# Patient Record
Sex: FEMALE | Race: WHITE | Employment: PART TIME | URBAN - METROPOLITAN AREA
[De-identification: names, ages, dates, MRNs, and addresses within clinical notes are randomized per-mention and may not be internally consistent; named-entity substitution may affect disease eponyms.]

---

## 2018-05-11 PROCEDURE — 87624 HPV HI-RISK TYP POOLED RSLT: CPT | Performed by: FAMILY MEDICINE

## 2018-05-11 PROCEDURE — 88175 CYTOPATH C/V AUTO FLUID REDO: CPT | Performed by: FAMILY MEDICINE

## 2022-01-21 ENCOUNTER — OFFICE VISIT (OUTPATIENT)
Dept: FAMILY MEDICINE CLINIC | Facility: CLINIC | Age: 57
End: 2022-01-21
Payer: COMMERCIAL

## 2022-01-21 VITALS
BODY MASS INDEX: 23.27 KG/M2 | HEIGHT: 67.28 IN | DIASTOLIC BLOOD PRESSURE: 60 MMHG | WEIGHT: 150 LBS | HEART RATE: 84 BPM | SYSTOLIC BLOOD PRESSURE: 100 MMHG | TEMPERATURE: 98 F

## 2022-01-21 DIAGNOSIS — Z00.00 LABORATORY EXAMINATION ORDERED AS PART OF A ROUTINE GENERAL MEDICAL EXAMINATION: ICD-10-CM

## 2022-01-21 DIAGNOSIS — Z01.419 WELL FEMALE EXAM WITH ROUTINE GYNECOLOGICAL EXAM: Primary | ICD-10-CM

## 2022-01-21 DIAGNOSIS — Z12.4 SCREENING FOR MALIGNANT NEOPLASM OF CERVIX: ICD-10-CM

## 2022-01-21 DIAGNOSIS — Z13.820 OSTEOPOROSIS SCREENING: ICD-10-CM

## 2022-01-21 DIAGNOSIS — Z12.31 VISIT FOR SCREENING MAMMOGRAM: ICD-10-CM

## 2022-01-21 PROCEDURE — 3074F SYST BP LT 130 MM HG: CPT | Performed by: FAMILY MEDICINE

## 2022-01-21 PROCEDURE — 99386 PREV VISIT NEW AGE 40-64: CPT | Performed by: FAMILY MEDICINE

## 2022-01-21 PROCEDURE — 88175 CYTOPATH C/V AUTO FLUID REDO: CPT | Performed by: FAMILY MEDICINE

## 2022-01-21 PROCEDURE — 87624 HPV HI-RISK TYP POOLED RSLT: CPT | Performed by: FAMILY MEDICINE

## 2022-01-21 PROCEDURE — 3078F DIAST BP <80 MM HG: CPT | Performed by: FAMILY MEDICINE

## 2022-01-21 PROCEDURE — 3008F BODY MASS INDEX DOCD: CPT | Performed by: FAMILY MEDICINE

## 2022-01-21 RX ORDER — LORATADINE 10 MG/1
10 TABLET ORAL DAILY PRN
COMMUNITY

## 2022-01-21 NOTE — PATIENT INSTRUCTIONS
Routine Healthcare for Women   Routine checkups can find treatable problems early. For many medical problems, early treatment can help prevent more serious complications. The value of checkups and how often you have them depend mainly on your age.  Your per family history of high cholesterol. • Colorectal cancer test: if you are 48 or older.  Recommended tests include a yearly test for blood in the stool, called the fecal occult blood test (FOBT) or fecal immunochemical test (FIT), and one of the following t personal and family medical history. Many other tests are often done at routine checkups, but there is no current evidence that they are helpful as routine screening tests for healthy women.  Examples of such tests are a CBC (complete blood count), thyroi a younger age if you have a high-risk medical condition, such as diabetes. • Varicella (chickenpox) if you have never had chickenpox. • Zoster (shingles) vaccine: if you are 50 or older. The vaccine can help prevent shingles.  It can also reduce the nish

## 2022-01-21 NOTE — PROGRESS NOTES
HPI:   Kendall Retana is a 64year old female who presents for a complete physical exam.   Patient has no significant past medical history besides a spontaneous pneumothorax in 1990.   Past surgical history negative  Menstrual history menopause at 48 last pa mg/dL    Blood Urea Nitrogen 23 (H) 8 - 20 mg/dL    Creatinine 0.77 0.55 - 1.02 mg/dL    Sodium 140 136 - 144 mmol/L    Potassium 4.7 3.6 - 5.1 mmol/L    Chloride 103 101 - 111 mmol/L    Carbon Dioxide 29.8 22.0 - 32.0 mmol/L    Calcium 9.6 8.3 - 10.3 mg/d Case: [id number]                                  Authorizing Provider:  Ruby March DO           Collected:           05/11/2018 09:27 AM          [place]ing [place]     [facility] [place]  Received:            05/14/2018 09:42 AM Disorder Father         vlve replacement   • Hypertension Father    • Lipids Father    • Other (Other) Father    • Lipids Mother    • Other (Other) Mother    • Other (Other) Daughter    • Other (Other) Son    • Heart Disorder Maternal Grandmother    • Hear 05/07/2013   BMI 23.30 kg/m²   Body mass index is 23.3 kg/m².    GENERAL: well developed, well nourished and in no apparent distress  SKIN: no rashes,no suspicious lesions  HEENT: atraumatic, normocephalic,ears, nose and throat are normal  EYES: PERRLA, EOM Health maintenance guidance given including vision and dental exams, vitamin D 1,000 iu daily with calcium 500 mg daily. Lifestyle guidance provided recommended low fat diet and aerobic exercise 30 minutes 3-4 times weekly.     Ultrafast CT of the heart an

## 2022-01-22 PROBLEM — Z01.419 WELL FEMALE EXAM WITH ROUTINE GYNECOLOGICAL EXAM: Status: ACTIVE | Noted: 2022-01-22

## 2022-01-24 LAB — HPV I/H RISK 1 DNA SPEC QL NAA+PROBE: NEGATIVE

## 2022-02-01 LAB — LAST PAP RESULT: NORMAL

## 2022-02-09 ENCOUNTER — HOSPITAL ENCOUNTER (OUTPATIENT)
Dept: BONE DENSITY | Age: 57
Discharge: HOME | End: 2022-02-09
Attending: FAMILY MEDICINE
Payer: COMMERCIAL

## 2022-02-09 ENCOUNTER — HOSPITAL ENCOUNTER (OUTPATIENT)
Dept: MAMMOGRAPHY | Age: 57
Discharge: HOME | End: 2022-02-09
Attending: FAMILY MEDICINE
Payer: COMMERCIAL

## 2022-02-09 ENCOUNTER — LAB ENCOUNTER (OUTPATIENT)
Dept: LAB | Age: 57
End: 2022-02-09
Attending: FAMILY MEDICINE
Payer: COMMERCIAL

## 2022-02-09 DIAGNOSIS — Z12.31 VISIT FOR SCREENING MAMMOGRAM: ICD-10-CM

## 2022-02-09 DIAGNOSIS — Z00.00 LABORATORY EXAMINATION ORDERED AS PART OF A ROUTINE GENERAL MEDICAL EXAMINATION: ICD-10-CM

## 2022-02-09 DIAGNOSIS — Z13.820 OSTEOPOROSIS SCREENING: ICD-10-CM

## 2022-02-09 LAB
ALBUMIN SERPL-MCNC: 4.1 G/DL (ref 3.4–5)
ALBUMIN/GLOB SERPL: 1.2 {RATIO} (ref 1–2)
ALP LIVER SERPL-CCNC: 60 U/L
ALT SERPL-CCNC: 18 U/L
ANION GAP SERPL CALC-SCNC: 7 MMOL/L (ref 0–18)
AST SERPL-CCNC: 15 U/L (ref 15–37)
BASOPHILS # BLD AUTO: 0.04 X10(3) UL (ref 0–0.2)
BASOPHILS NFR BLD AUTO: 0.8 %
BILIRUB SERPL-MCNC: 0.7 MG/DL (ref 0.1–2)
BUN BLD-MCNC: 16 MG/DL (ref 7–18)
CALCIUM BLD-MCNC: 9.9 MG/DL (ref 8.5–10.1)
CHLORIDE SERPL-SCNC: 105 MMOL/L (ref 98–112)
CHOLEST SERPL-MCNC: 292 MG/DL (ref ?–200)
CO2 SERPL-SCNC: 27 MMOL/L (ref 21–32)
CREAT BLD-MCNC: 0.78 MG/DL
EOSINOPHIL # BLD AUTO: 0.1 X10(3) UL (ref 0–0.7)
EOSINOPHIL NFR BLD AUTO: 2 %
ERYTHROCYTE [DISTWIDTH] IN BLOOD BY AUTOMATED COUNT: 12.6 %
FASTING PATIENT LIPID ANSWER: YES
FASTING STATUS PATIENT QL REPORTED: YES
GLOBULIN PLAS-MCNC: 3.3 G/DL (ref 2.8–4.4)
GLUCOSE BLD-MCNC: 95 MG/DL (ref 70–99)
HCT VFR BLD AUTO: 40.5 %
HDLC SERPL-MCNC: 104 MG/DL (ref 40–59)
HGB BLD-MCNC: 13.6 G/DL
IMM GRANULOCYTES # BLD AUTO: 0.01 X10(3) UL (ref 0–1)
IMM GRANULOCYTES NFR BLD: 0.2 %
LDLC SERPL CALC-MCNC: 173 MG/DL (ref ?–100)
LYMPHOCYTES # BLD AUTO: 2.04 X10(3) UL (ref 1–4)
LYMPHOCYTES NFR BLD AUTO: 39.9 %
MCH RBC QN AUTO: 30.9 PG (ref 26–34)
MCHC RBC AUTO-ENTMCNC: 33.6 G/DL (ref 31–37)
MCV RBC AUTO: 92 FL
MONOCYTES # BLD AUTO: 0.43 X10(3) UL (ref 0.1–1)
MONOCYTES NFR BLD AUTO: 8.4 %
NEUTROPHILS # BLD AUTO: 2.49 X10 (3) UL (ref 1.5–7.7)
NEUTROPHILS # BLD AUTO: 2.49 X10(3) UL (ref 1.5–7.7)
NEUTROPHILS NFR BLD AUTO: 48.7 %
NONHDLC SERPL-MCNC: 188 MG/DL (ref ?–130)
OSMOLALITY SERPL CALC.SUM OF ELEC: 289 MOSM/KG (ref 275–295)
PLATELET # BLD AUTO: 244 10(3)UL (ref 150–450)
POTASSIUM SERPL-SCNC: 3.8 MMOL/L (ref 3.5–5.1)
PROT SERPL-MCNC: 7.4 G/DL (ref 6.4–8.2)
RBC # BLD AUTO: 4.4 X10(6)UL
SODIUM SERPL-SCNC: 139 MMOL/L (ref 136–145)
T4 FREE SERPL-MCNC: 0.9 NG/DL (ref 0.8–1.7)
TRIGL SERPL-MCNC: 93 MG/DL (ref 30–149)
TSI SER-ACNC: 2.55 MIU/ML (ref 0.36–3.74)
VLDLC SERPL CALC-MCNC: 18 MG/DL (ref 0–30)
WBC # BLD AUTO: 5.1 X10(3) UL (ref 4–11)

## 2022-02-09 PROCEDURE — 84439 ASSAY OF FREE THYROXINE: CPT | Performed by: FAMILY MEDICINE

## 2022-02-09 PROCEDURE — 80050 GENERAL HEALTH PANEL: CPT | Performed by: FAMILY MEDICINE

## 2022-02-09 PROCEDURE — 77063 BREAST TOMOSYNTHESIS BI: CPT | Performed by: FAMILY MEDICINE

## 2022-02-09 PROCEDURE — 77080 DXA BONE DENSITY AXIAL: CPT | Performed by: FAMILY MEDICINE

## 2022-02-09 PROCEDURE — 80061 LIPID PANEL: CPT | Performed by: FAMILY MEDICINE

## 2022-02-09 PROCEDURE — 77067 SCR MAMMO BI INCL CAD: CPT | Performed by: FAMILY MEDICINE

## 2022-02-10 NOTE — PROGRESS NOTES
Mild osteopenia lumbar spine and femoral neck there is no osteoporosis. Repeat in 2-3 years. No smoking, regular exercise, take calcium 500 mg and vitamin D 1,000 iu daily. Weight bearing exercise. Follow-up to discuss test results.

## 2022-02-28 ENCOUNTER — OFFICE VISIT (OUTPATIENT)
Dept: FAMILY MEDICINE CLINIC | Facility: CLINIC | Age: 57
End: 2022-02-28
Payer: COMMERCIAL

## 2022-02-28 VITALS
BODY MASS INDEX: 23.58 KG/M2 | TEMPERATURE: 99 F | SYSTOLIC BLOOD PRESSURE: 100 MMHG | HEIGHT: 67.28 IN | DIASTOLIC BLOOD PRESSURE: 64 MMHG | HEART RATE: 84 BPM | WEIGHT: 152 LBS

## 2022-02-28 DIAGNOSIS — E78.2 MIXED HYPERLIPIDEMIA: Primary | ICD-10-CM

## 2022-02-28 PROCEDURE — 3008F BODY MASS INDEX DOCD: CPT | Performed by: FAMILY MEDICINE

## 2022-02-28 PROCEDURE — 3074F SYST BP LT 130 MM HG: CPT | Performed by: FAMILY MEDICINE

## 2022-02-28 PROCEDURE — 99213 OFFICE O/P EST LOW 20 MIN: CPT | Performed by: FAMILY MEDICINE

## 2022-02-28 PROCEDURE — 3078F DIAST BP <80 MM HG: CPT | Performed by: FAMILY MEDICINE

## 2022-03-01 PROBLEM — E78.2 MIXED HYPERLIPIDEMIA: Status: ACTIVE | Noted: 2022-03-01

## 2022-03-01 PROBLEM — Z01.419 WELL FEMALE EXAM WITH ROUTINE GYNECOLOGICAL EXAM: Status: RESOLVED | Noted: 2022-01-22 | Resolved: 2022-03-01

## 2022-04-12 ENCOUNTER — HOSPITAL ENCOUNTER (OUTPATIENT)
Dept: CT IMAGING | Age: 57
Discharge: HOME | End: 2022-04-12
Attending: FAMILY MEDICINE

## 2022-04-12 DIAGNOSIS — Z13.6 SCREENING FOR CARDIOVASCULAR CONDITION: ICD-10-CM

## 2022-04-18 ENCOUNTER — HOSPITAL ENCOUNTER (OUTPATIENT)
Dept: ULTRASOUND IMAGING | Facility: HOSPITAL | Age: 57
Discharge: HOME | End: 2022-04-18
Attending: FAMILY MEDICINE

## 2022-04-18 DIAGNOSIS — Z13.9 ENCOUNTER FOR SCREENING: ICD-10-CM

## 2022-07-05 ENCOUNTER — PATIENT MESSAGE (OUTPATIENT)
Dept: FAMILY MEDICINE CLINIC | Facility: CLINIC | Age: 57
End: 2022-07-05

## 2022-07-06 NOTE — TELEPHONE ENCOUNTER
From: Donya Salguero  To: Stepan Evans PA-C  Sent: 7/5/2022 1:36 PM CDT  Subject: Follow-Up Bloodwork    Hi,  I hope all is well. I am following up on next steps regarding my high cholesterol which I believe is a lipid profile. Do I need a referral? And once I have completed the profile I will schedule an appointment. Please share any thoughts or questions. Thank you and enjoy the day!   [place]

## 2022-08-05 ENCOUNTER — LABORATORY ENCOUNTER (OUTPATIENT)
Dept: LAB | Age: 57
End: 2022-08-05
Attending: FAMILY MEDICINE
Payer: COMMERCIAL

## 2022-08-05 DIAGNOSIS — E78.2 MIXED HYPERLIPIDEMIA: ICD-10-CM

## 2022-08-05 LAB
CHOLEST SERPL-MCNC: 246 MG/DL (ref ?–200)
FASTING PATIENT LIPID ANSWER: YES
HDLC SERPL-MCNC: 88 MG/DL (ref 40–59)
LDLC SERPL CALC-MCNC: 143 MG/DL (ref ?–100)
NONHDLC SERPL-MCNC: 158 MG/DL (ref ?–130)
TRIGL SERPL-MCNC: 89 MG/DL (ref 30–149)
VLDLC SERPL CALC-MCNC: 16 MG/DL (ref 0–30)

## 2022-08-05 PROCEDURE — 80061 LIPID PANEL: CPT | Performed by: FAMILY MEDICINE

## 2022-08-06 NOTE — PROGRESS NOTES
Lipids cholesterol and LDL are improving you have had a normal heart scan and peripheral vascular screening. Continue with the healthy lifestyle adding Reynaldo seeds and low saturated fat meals. Repeat again in 6 months if goes up again would consider statin for cardioprotection. Place order for lipid in 6 months.

## 2022-08-09 DIAGNOSIS — E78.2 MIXED HYPERLIPIDEMIA: Primary | ICD-10-CM

## 2023-09-15 ENCOUNTER — OFFICE VISIT (OUTPATIENT)
Dept: FAMILY MEDICINE CLINIC | Facility: CLINIC | Age: 58
End: 2023-09-15
Payer: COMMERCIAL

## 2023-09-15 VITALS
BODY MASS INDEX: 24.17 KG/M2 | TEMPERATURE: 98 F | WEIGHT: 154 LBS | DIASTOLIC BLOOD PRESSURE: 64 MMHG | HEIGHT: 67 IN | SYSTOLIC BLOOD PRESSURE: 110 MMHG | OXYGEN SATURATION: 98 % | HEART RATE: 70 BPM | RESPIRATION RATE: 18 BRPM

## 2023-09-15 DIAGNOSIS — Z13.0 SCREENING FOR ENDOCRINE, NUTRITIONAL, METABOLIC AND IMMUNITY DISORDER: ICD-10-CM

## 2023-09-15 DIAGNOSIS — Z13.220 ENCOUNTER FOR LIPID SCREENING FOR CARDIOVASCULAR DISEASE: ICD-10-CM

## 2023-09-15 DIAGNOSIS — Z13.6 ENCOUNTER FOR LIPID SCREENING FOR CARDIOVASCULAR DISEASE: ICD-10-CM

## 2023-09-15 DIAGNOSIS — Z13.29 SCREENING FOR ENDOCRINE, NUTRITIONAL, METABOLIC AND IMMUNITY DISORDER: ICD-10-CM

## 2023-09-15 DIAGNOSIS — Z13.21 SCREENING FOR ENDOCRINE, NUTRITIONAL, METABOLIC AND IMMUNITY DISORDER: ICD-10-CM

## 2023-09-15 DIAGNOSIS — Z28.21 REFUSED INFLUENZA VACCINE: ICD-10-CM

## 2023-09-15 DIAGNOSIS — Z13.228 SCREENING FOR ENDOCRINE, NUTRITIONAL, METABOLIC AND IMMUNITY DISORDER: ICD-10-CM

## 2023-09-15 DIAGNOSIS — Z12.31 SCREENING MAMMOGRAM FOR BREAST CANCER: ICD-10-CM

## 2023-09-15 DIAGNOSIS — Z01.419 WELL FEMALE EXAM WITH ROUTINE GYNECOLOGICAL EXAM: Primary | ICD-10-CM

## 2023-09-15 DIAGNOSIS — Z12.4 ENCOUNTER FOR SCREENING FOR CERVICAL CANCER: ICD-10-CM

## 2023-09-15 DIAGNOSIS — D22.9 MULTIPLE PIGMENTED NEVI: ICD-10-CM

## 2023-09-15 DIAGNOSIS — Z11.51 ENCOUNTER FOR SCREENING FOR HUMAN PAPILLOMAVIRUS (HPV): ICD-10-CM

## 2023-09-15 PROCEDURE — 88175 CYTOPATH C/V AUTO FLUID REDO: CPT | Performed by: FAMILY MEDICINE

## 2023-09-15 PROCEDURE — 87624 HPV HI-RISK TYP POOLED RSLT: CPT | Performed by: FAMILY MEDICINE

## 2023-09-18 LAB — HPV I/H RISK 1 DNA SPEC QL NAA+PROBE: NEGATIVE

## 2023-10-11 ENCOUNTER — LABORATORY ENCOUNTER (OUTPATIENT)
Dept: LAB | Age: 58
End: 2023-10-11
Attending: FAMILY MEDICINE
Payer: COMMERCIAL

## 2023-10-11 DIAGNOSIS — Z13.0 SCREENING FOR ENDOCRINE, NUTRITIONAL, METABOLIC AND IMMUNITY DISORDER: ICD-10-CM

## 2023-10-11 DIAGNOSIS — Z13.21 SCREENING FOR ENDOCRINE, NUTRITIONAL, METABOLIC AND IMMUNITY DISORDER: ICD-10-CM

## 2023-10-11 DIAGNOSIS — Z13.29 SCREENING FOR ENDOCRINE, NUTRITIONAL, METABOLIC AND IMMUNITY DISORDER: ICD-10-CM

## 2023-10-11 DIAGNOSIS — Z13.220 ENCOUNTER FOR LIPID SCREENING FOR CARDIOVASCULAR DISEASE: ICD-10-CM

## 2023-10-11 DIAGNOSIS — Z13.6 ENCOUNTER FOR LIPID SCREENING FOR CARDIOVASCULAR DISEASE: ICD-10-CM

## 2023-10-11 DIAGNOSIS — Z13.228 SCREENING FOR ENDOCRINE, NUTRITIONAL, METABOLIC AND IMMUNITY DISORDER: ICD-10-CM

## 2023-10-11 LAB
ALBUMIN SERPL-MCNC: 4 G/DL (ref 3.4–5)
ALBUMIN/GLOB SERPL: 1.1 {RATIO} (ref 1–2)
ALP LIVER SERPL-CCNC: 62 U/L
ALT SERPL-CCNC: 21 U/L
ANION GAP SERPL CALC-SCNC: 5 MMOL/L (ref 0–18)
AST SERPL-CCNC: 8 U/L (ref 15–37)
BASOPHILS # BLD AUTO: 0.03 X10(3) UL (ref 0–0.2)
BASOPHILS NFR BLD AUTO: 0.7 %
BILIRUB SERPL-MCNC: 0.7 MG/DL (ref 0.1–2)
BUN BLD-MCNC: 16 MG/DL (ref 7–18)
CALCIUM BLD-MCNC: 9.2 MG/DL (ref 8.5–10.1)
CHLORIDE SERPL-SCNC: 107 MMOL/L (ref 98–112)
CHOLEST SERPL-MCNC: 238 MG/DL (ref ?–200)
CO2 SERPL-SCNC: 27 MMOL/L (ref 21–32)
CREAT BLD-MCNC: 0.77 MG/DL
EGFRCR SERPLBLD CKD-EPI 2021: 90 ML/MIN/1.73M2 (ref 60–?)
EOSINOPHIL # BLD AUTO: 0.18 X10(3) UL (ref 0–0.7)
EOSINOPHIL NFR BLD AUTO: 4.4 %
ERYTHROCYTE [DISTWIDTH] IN BLOOD BY AUTOMATED COUNT: 12.5 %
FASTING PATIENT LIPID ANSWER: YES
FASTING STATUS PATIENT QL REPORTED: YES
GLOBULIN PLAS-MCNC: 3.6 G/DL (ref 2.8–4.4)
GLUCOSE BLD-MCNC: 90 MG/DL (ref 70–99)
HCT VFR BLD AUTO: 39.5 %
HDLC SERPL-MCNC: 94 MG/DL (ref 40–59)
HGB BLD-MCNC: 13.1 G/DL
IMM GRANULOCYTES # BLD AUTO: 0.01 X10(3) UL (ref 0–1)
IMM GRANULOCYTES NFR BLD: 0.2 %
LDLC SERPL CALC-MCNC: 129 MG/DL (ref ?–100)
LYMPHOCYTES # BLD AUTO: 1.55 X10(3) UL (ref 1–4)
LYMPHOCYTES NFR BLD AUTO: 37.8 %
MCH RBC QN AUTO: 31 PG (ref 26–34)
MCHC RBC AUTO-ENTMCNC: 33.2 G/DL (ref 31–37)
MCV RBC AUTO: 93.6 FL
MONOCYTES # BLD AUTO: 0.36 X10(3) UL (ref 0.1–1)
MONOCYTES NFR BLD AUTO: 8.8 %
NEUTROPHILS # BLD AUTO: 1.97 X10 (3) UL (ref 1.5–7.7)
NEUTROPHILS # BLD AUTO: 1.97 X10(3) UL (ref 1.5–7.7)
NEUTROPHILS NFR BLD AUTO: 48.1 %
NONHDLC SERPL-MCNC: 144 MG/DL (ref ?–130)
OSMOLALITY SERPL CALC.SUM OF ELEC: 289 MOSM/KG (ref 275–295)
PLATELET # BLD AUTO: 232 10(3)UL (ref 150–450)
POTASSIUM SERPL-SCNC: 4.2 MMOL/L (ref 3.5–5.1)
PROT SERPL-MCNC: 7.6 G/DL (ref 6.4–8.2)
RBC # BLD AUTO: 4.22 X10(6)UL
SODIUM SERPL-SCNC: 139 MMOL/L (ref 136–145)
TRIGL SERPL-MCNC: 87 MG/DL (ref 30–149)
TSI SER-ACNC: 3.6 MIU/ML (ref 0.36–3.74)
VLDLC SERPL CALC-MCNC: 16 MG/DL (ref 0–30)
WBC # BLD AUTO: 4.1 X10(3) UL (ref 4–11)

## 2023-10-11 PROCEDURE — 80050 GENERAL HEALTH PANEL: CPT | Performed by: FAMILY MEDICINE

## 2023-10-11 PROCEDURE — 80061 LIPID PANEL: CPT | Performed by: FAMILY MEDICINE

## 2023-10-12 NOTE — PROGRESS NOTES
Continue with reducing saturated fats LDL is improved try gomez seeds in daily to your diet and repeat in 1 year. Decrease saturated fats. Eat lean cuts of meat such as chicken and fish (salmon and tuna). If able, add gomez seeds, almonds, walnuts, pumpkin seeds, sunflower seeds, beans, whole grain cereals to diet. Cook with grape seed oil or olive oil. Review the Mediterranean diet on line. Normal white and red blood cell counts. Normal kidney and liver function testing. Normal blood sugar testing.   Normal lipid testing   Normal thyroid testing  Sincerely,   Carlos A Cortes PA-C

## 2023-10-19 ENCOUNTER — HOSPITAL ENCOUNTER (OUTPATIENT)
Dept: MAMMOGRAPHY | Age: 58
Discharge: HOME | End: 2023-10-19
Attending: FAMILY MEDICINE
Payer: COMMERCIAL

## 2023-10-19 DIAGNOSIS — R92.333 HETEROGENEOUSLY DENSE TISSUE OF BOTH BREASTS ON MAMMOGRAPHY: Primary | ICD-10-CM

## 2023-10-19 DIAGNOSIS — Z12.31 SCREENING MAMMOGRAM FOR BREAST CANCER: ICD-10-CM

## 2023-10-19 PROCEDURE — 77067 SCR MAMMO BI INCL CAD: CPT | Performed by: FAMILY MEDICINE

## 2023-10-19 PROCEDURE — 77063 BREAST TOMOSYNTHESIS BI: CPT | Performed by: FAMILY MEDICINE

## 2023-10-19 NOTE — PROGRESS NOTES
Normal mammogram with dense breast tissue repeat mammogram in 1 year. Mammogram is normal repeat in one year. You would also benefit from doing a bilateral whole breast screening ultrasound for increased sensitivity for detection of breast cancer which would be obscured secondary to dense breasts. Order placed if you would like to have this done. Scheduling Instructions: To schedule an appointment for your radiology test please call [facility] at [phone].       Sincerely,   Valdemar Cogan PA-C

## 2024-10-02 ENCOUNTER — PATIENT MESSAGE (OUTPATIENT)
Dept: FAMILY MEDICINE CLINIC | Facility: CLINIC | Age: 59
End: 2024-10-02

## 2024-10-02 DIAGNOSIS — Z13.29 SCREENING FOR ENDOCRINE, NUTRITIONAL, METABOLIC AND IMMUNITY DISORDER: ICD-10-CM

## 2024-10-02 DIAGNOSIS — Z12.31 VISIT FOR SCREENING MAMMOGRAM: Primary | ICD-10-CM

## 2024-10-02 DIAGNOSIS — Z13.0 SCREENING FOR ENDOCRINE, NUTRITIONAL, METABOLIC AND IMMUNITY DISORDER: ICD-10-CM

## 2024-10-02 DIAGNOSIS — Z13.6 ENCOUNTER FOR LIPID SCREENING FOR CARDIOVASCULAR DISEASE: ICD-10-CM

## 2024-10-02 DIAGNOSIS — Z13.220 ENCOUNTER FOR LIPID SCREENING FOR CARDIOVASCULAR DISEASE: ICD-10-CM

## 2024-10-02 DIAGNOSIS — Z13.228 SCREENING FOR ENDOCRINE, NUTRITIONAL, METABOLIC AND IMMUNITY DISORDER: ICD-10-CM

## 2024-10-02 DIAGNOSIS — Z13.21 SCREENING FOR ENDOCRINE, NUTRITIONAL, METABOLIC AND IMMUNITY DISORDER: ICD-10-CM

## 2024-10-02 NOTE — TELEPHONE ENCOUNTER
From: Lisbeth Francisco  To: Stacie Syed  Sent: 10/2/2024 7:44 AM CDT  Subject: Referrals    Happy Wednesday!! I thought I scheduled an appointment today but did not for some reason. LOL!! At least I checked and did not just show up!! I would like to schedule bloodwork, mammograms and a more detalied mammogram prior to my scheduled appointment. Can you send me referrals prior to my December appointment so I can schedule before the end of the year? Thanks

## 2024-11-08 ENCOUNTER — HOSPITAL ENCOUNTER (OUTPATIENT)
Dept: MAMMOGRAPHY | Age: 59
Discharge: HOME | End: 2024-11-08
Attending: FAMILY MEDICINE
Payer: COMMERCIAL

## 2024-11-08 DIAGNOSIS — R92.333 HETEROGENEOUSLY DENSE TISSUE OF BOTH BREASTS ON MAMMOGRAPHY: Primary | ICD-10-CM

## 2024-11-08 DIAGNOSIS — Z12.31 VISIT FOR SCREENING MAMMOGRAM: ICD-10-CM

## 2024-11-08 PROCEDURE — 77067 SCR MAMMO BI INCL CAD: CPT | Performed by: FAMILY MEDICINE

## 2024-11-08 PROCEDURE — 77063 BREAST TOMOSYNTHESIS BI: CPT | Performed by: FAMILY MEDICINE

## 2024-11-08 NOTE — PROGRESS NOTES
Left breast asymmetry.  Additional views recommended.  Schedule at your earliest convenience    Due to dense breasts bilaterally you would also benefit from doing a bilateral whole breast screening ultrasound for increased sensitivity for detection of breast cancer which could be obscured secondary to dense breasts.  Order placed if you would like to have this done.    Scheduling Instructions:  To schedule an appointment for your radiology test please call [facility] Scheduling at [phone].      Sincerely,   Stacie Syed PA-C

## 2024-11-22 ENCOUNTER — HOSPITAL ENCOUNTER (OUTPATIENT)
Dept: MAMMOGRAPHY | Facility: HOSPITAL | Age: 59
Discharge: HOME | End: 2024-11-22
Attending: FAMILY MEDICINE
Payer: COMMERCIAL

## 2024-11-22 DIAGNOSIS — R92.2 INCONCLUSIVE MAMMOGRAM: ICD-10-CM

## 2024-11-22 PROCEDURE — 77065 DX MAMMO INCL CAD UNI: CPT | Performed by: FAMILY MEDICINE

## 2024-11-22 PROCEDURE — 76642 ULTRASOUND BREAST LIMITED: CPT | Performed by: FAMILY MEDICINE

## 2024-11-22 PROCEDURE — 77061 BREAST TOMOSYNTHESIS UNI: CPT | Performed by: FAMILY MEDICINE

## 2024-12-06 ENCOUNTER — LAB ENCOUNTER (OUTPATIENT)
Dept: LAB | Age: 59
End: 2024-12-06
Attending: FAMILY MEDICINE
Payer: COMMERCIAL

## 2024-12-06 DIAGNOSIS — Z13.6 ENCOUNTER FOR LIPID SCREENING FOR CARDIOVASCULAR DISEASE: ICD-10-CM

## 2024-12-06 DIAGNOSIS — Z13.220 ENCOUNTER FOR LIPID SCREENING FOR CARDIOVASCULAR DISEASE: ICD-10-CM

## 2024-12-06 DIAGNOSIS — Z13.228 SCREENING FOR ENDOCRINE, NUTRITIONAL, METABOLIC AND IMMUNITY DISORDER: ICD-10-CM

## 2024-12-06 DIAGNOSIS — Z13.29 SCREENING FOR ENDOCRINE, NUTRITIONAL, METABOLIC AND IMMUNITY DISORDER: ICD-10-CM

## 2024-12-06 DIAGNOSIS — Z13.21 SCREENING FOR ENDOCRINE, NUTRITIONAL, METABOLIC AND IMMUNITY DISORDER: ICD-10-CM

## 2024-12-06 DIAGNOSIS — Z13.0 SCREENING FOR ENDOCRINE, NUTRITIONAL, METABOLIC AND IMMUNITY DISORDER: ICD-10-CM

## 2024-12-06 LAB
ALBUMIN SERPL-MCNC: 4.8 G/DL (ref 3.2–4.8)
ALBUMIN/GLOB SERPL: 1.8 {RATIO} (ref 1–2)
ALP LIVER SERPL-CCNC: 68 U/L
ALT SERPL-CCNC: 14 U/L
ANION GAP SERPL CALC-SCNC: 7 MMOL/L (ref 0–18)
AST SERPL-CCNC: 25 U/L (ref ?–34)
BASOPHILS # BLD AUTO: 0.03 X10(3) UL (ref 0–0.2)
BASOPHILS NFR BLD AUTO: 0.6 %
BILIRUB SERPL-MCNC: 0.9 MG/DL (ref 0.3–1.2)
BUN BLD-MCNC: 18 MG/DL (ref 9–23)
CALCIUM BLD-MCNC: 10.1 MG/DL (ref 8.7–10.4)
CHLORIDE SERPL-SCNC: 106 MMOL/L (ref 98–112)
CHOLEST SERPL-MCNC: 275 MG/DL (ref ?–200)
CO2 SERPL-SCNC: 28 MMOL/L (ref 21–32)
CREAT BLD-MCNC: 0.91 MG/DL
EGFRCR SERPLBLD CKD-EPI 2021: 73 ML/MIN/1.73M2 (ref 60–?)
EOSINOPHIL # BLD AUTO: 0.18 X10(3) UL (ref 0–0.7)
EOSINOPHIL NFR BLD AUTO: 3.6 %
ERYTHROCYTE [DISTWIDTH] IN BLOOD BY AUTOMATED COUNT: 12.7 %
FASTING PATIENT LIPID ANSWER: YES
FASTING STATUS PATIENT QL REPORTED: YES
GLOBULIN PLAS-MCNC: 2.6 G/DL (ref 2–3.5)
GLUCOSE BLD-MCNC: 90 MG/DL (ref 70–99)
HCT VFR BLD AUTO: 41.9 %
HDLC SERPL-MCNC: 91 MG/DL (ref 40–59)
HGB BLD-MCNC: 13.8 G/DL
IMM GRANULOCYTES # BLD AUTO: 0.01 X10(3) UL (ref 0–1)
IMM GRANULOCYTES NFR BLD: 0.2 %
LDLC SERPL CALC-MCNC: 169 MG/DL (ref ?–100)
LYMPHOCYTES # BLD AUTO: 1.84 X10(3) UL (ref 1–4)
LYMPHOCYTES NFR BLD AUTO: 36.9 %
MCH RBC QN AUTO: 31.6 PG (ref 26–34)
MCHC RBC AUTO-ENTMCNC: 32.9 G/DL (ref 31–37)
MCV RBC AUTO: 95.9 FL
MONOCYTES # BLD AUTO: 0.44 X10(3) UL (ref 0.1–1)
MONOCYTES NFR BLD AUTO: 8.8 %
NEUTROPHILS # BLD AUTO: 2.48 X10 (3) UL (ref 1.5–7.7)
NEUTROPHILS # BLD AUTO: 2.48 X10(3) UL (ref 1.5–7.7)
NEUTROPHILS NFR BLD AUTO: 49.9 %
NONHDLC SERPL-MCNC: 184 MG/DL (ref ?–130)
OSMOLALITY SERPL CALC.SUM OF ELEC: 293 MOSM/KG (ref 275–295)
PLATELET # BLD AUTO: 257 10(3)UL (ref 150–450)
POTASSIUM SERPL-SCNC: 4.3 MMOL/L (ref 3.5–5.1)
PROT SERPL-MCNC: 7.4 G/DL (ref 5.7–8.2)
RBC # BLD AUTO: 4.37 X10(6)UL
SODIUM SERPL-SCNC: 141 MMOL/L (ref 136–145)
T4 FREE SERPL-MCNC: 1 NG/DL (ref 0.8–1.7)
TRIGL SERPL-MCNC: 91 MG/DL (ref 30–149)
TSI SER-ACNC: 3.63 UIU/ML (ref 0.55–4.78)
VLDLC SERPL CALC-MCNC: 18 MG/DL (ref 0–30)
WBC # BLD AUTO: 5 X10(3) UL (ref 4–11)

## 2024-12-06 PROCEDURE — 84443 ASSAY THYROID STIM HORMONE: CPT

## 2024-12-06 PROCEDURE — 36415 COLL VENOUS BLD VENIPUNCTURE: CPT

## 2024-12-06 PROCEDURE — 85025 COMPLETE CBC W/AUTO DIFF WBC: CPT

## 2024-12-06 PROCEDURE — 80061 LIPID PANEL: CPT

## 2024-12-06 PROCEDURE — 84439 ASSAY OF FREE THYROXINE: CPT

## 2024-12-06 PROCEDURE — 80053 COMPREHEN METABOLIC PANEL: CPT

## 2024-12-07 NOTE — PROGRESS NOTES
Will discuss elevated lipids at office visit.  Statin should be considered for cardioprotection.  Decrease saturated fats and avoid processed foods.  Avoid fatty foods/fried foods.  If eating meat try lean cuts of meat such as chicken and fish (salmon and tuna).  If able, add gomez seeds, almonds, walnuts, pumpkin seeds, sunflower seeds, beans, fruits, vegetables, avocado steel cut oats, lentils, quinoa, rye, wild rice, whole grain cereals to diet.   Cook with avocado oil, grape seed oil or olive oil. Review the Mediterranean diet on line.    Normal white and red blood cell counts.  Normal kidney and liver function testing.  Normal blood sugar testing.  Normal thyroid testing    Sincerely,   Stacie Syed PA-C

## 2024-12-12 ENCOUNTER — OFFICE VISIT (OUTPATIENT)
Dept: FAMILY MEDICINE CLINIC | Facility: CLINIC | Age: 59
End: 2024-12-12
Payer: COMMERCIAL

## 2024-12-12 VITALS
BODY MASS INDEX: 24.8 KG/M2 | TEMPERATURE: 98 F | RESPIRATION RATE: 18 BRPM | HEART RATE: 74 BPM | HEIGHT: 67 IN | OXYGEN SATURATION: 98 % | WEIGHT: 158 LBS | SYSTOLIC BLOOD PRESSURE: 96 MMHG | DIASTOLIC BLOOD PRESSURE: 56 MMHG

## 2024-12-12 DIAGNOSIS — Z28.21 REFUSED INFLUENZA VACCINE: ICD-10-CM

## 2024-12-12 DIAGNOSIS — Z00.00 WELL FEMALE EXAM WITHOUT GYNECOLOGICAL EXAM: Primary | ICD-10-CM

## 2024-12-12 DIAGNOSIS — E78.2 MIXED HYPERLIPIDEMIA: ICD-10-CM

## 2024-12-12 DIAGNOSIS — Z79.899 NEW MEDICATION ADDED: ICD-10-CM

## 2024-12-12 DIAGNOSIS — N95.2 VAGINAL ATROPHY: ICD-10-CM

## 2024-12-12 DIAGNOSIS — Z78.0 POSTMENOPAUSAL: ICD-10-CM

## 2024-12-12 RX ORDER — ROSUVASTATIN CALCIUM 5 MG/1
5 TABLET, COATED ORAL EVERY OTHER DAY
Qty: 45 TABLET | Refills: 1 | Status: SHIPPED | OUTPATIENT
Start: 2024-12-12

## 2024-12-12 NOTE — PROGRESS NOTES
HPI:   Lisbeth Francisco is a 59 year old female who presents for a complete physical exam.     -Preventative care   Immunizations  TDAP 2018 will get shingles at pharmacy defers vaccination at office  Dental exams UTD  Eye exams UTD  PHQ9  0  Sleep Apnea  None, no snoring  Exercise 3-4 times a week walking biking  DIet  Good , eats out 1/week  Alcohol 0 to 2-week  2022 Ultrafast CT of the heart 0 calcifications  2022 bone density lumbar spine -1.1, total hip -1.0, femoral neck -1.3 taking Vitamin D   2024 mammogram  2022 heart scan no plaque  Colon cancer screening completed colonoscopy 2019 is due 2029 no family history colon cancer    GYNE  Menstrual history menopause at 50 no bleeding, no discharge or  symptoms  Last pap 9/15/2023 Pap normal negative HPV  Mammogram 2024 normal no family history of breast or ovarian cancer  Sexually active with  dryness has estrogen cream thin film 2-3 times a week   Sleep/emotional difficulty emotionally doing well is doing counseling weekly none PHQ 9 0  LMP  At 50    STD history never    Family history PGF stroke, AMI MGF; father PE/ hyperlipidemia/amyloidosis  of heart disease at 79; Mom thyroid and hyperlipidemia      Social history  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 Walking, weight training, goes to the gym.  Has 2 children 23-year-old [place] with special needs and a 22-year-old [employer]      Past medical history  spontaneous pneumothorax in .  Past surgical history negative        Wt Readings from Last 6 Encounters:   24 158 lb (71.7 kg)   09/15/23 154 lb (69.9 kg)   22 152 lb (68.9 kg)   22 150 lb (68 kg)   19 140 lb (63.5 kg)   10/10/19 146 lb (66.2 kg)     Body mass index is 24.75 kg/m².       Results for orders placed or performed  in visit on 12/06/24   CBC With Differential With Platelet    Collection Time: 12/06/24  8:56 AM   Result Value Ref Range    WBC 5.0 4.0 - 11.0 x10(3) uL    RBC 4.37 3.80 - 5.30 x10(6)uL    HGB 13.8 12.0 - 16.0 g/dL    HCT 41.9 35.0 - 48.0 %    .0 150.0 - 450.0 10(3)uL    MCV 95.9 80.0 - 100.0 fL    MCH 31.6 26.0 - 34.0 pg    MCHC 32.9 31.0 - 37.0 g/dL    RDW 12.7 %    Neutrophil Absolute Prelim 2.48 1.50 - 7.70 x10 (3) uL    Neutrophil Absolute 2.48 1.50 - 7.70 x10(3) uL    Lymphocyte Absolute 1.84 1.00 - 4.00 x10(3) uL    Monocyte Absolute 0.44 0.10 - 1.00 x10(3) uL    Eosinophil Absolute 0.18 0.00 - 0.70 x10(3) uL    Basophil Absolute 0.03 0.00 - 0.20 x10(3) uL    Immature Granulocyte Absolute 0.01 0.00 - 1.00 x10(3) uL    Neutrophil % 49.9 %    Lymphocyte % 36.9 %    Monocyte % 8.8 %    Eosinophil % 3.6 %    Basophil % 0.6 %    Immature Granulocyte % 0.2 %   Comp Metabolic Panel (14)    Collection Time: 12/06/24  8:56 AM   Result Value Ref Range    Glucose 90 70 - 99 mg/dL    Sodium 141 136 - 145 mmol/L    Potassium 4.3 3.5 - 5.1 mmol/L    Chloride 106 98 - 112 mmol/L    CO2 28.0 21.0 - 32.0 mmol/L    Anion Gap 7 0 - 18 mmol/L    BUN 18 9 - 23 mg/dL    Creatinine 0.91 0.55 - 1.02 mg/dL    Calcium, Total 10.1 8.7 - 10.4 mg/dL    Calculated Osmolality 293 275 - 295 mOsm/kg    eGFR-Cr 73 >=60 mL/min/1.73m2    AST 25 <34 U/L    ALT 14 10 - 49 U/L    Alkaline Phosphatase 68 46 - 118 U/L    Bilirubin, Total 0.9 0.3 - 1.2 mg/dL    Total Protein 7.4 5.7 - 8.2 g/dL    Albumin 4.8 3.2 - 4.8 g/dL    Globulin  2.6 2.0 - 3.5 g/dL    A/G Ratio 1.8 1.0 - 2.0    Patient Fasting for CMP? Yes    Lipid Panel    Collection Time: 12/06/24  8:56 AM   Result Value Ref Range    Cholesterol, Total 275 (H) <200 mg/dL    HDL Cholesterol 91 (H) 40 - 59 mg/dL    Triglycerides 91 30 - 149 mg/dL    LDL Cholesterol 169 (H) <100 mg/dL    VLDL 18 0 - 30 mg/dL    Non HDL Chol 184 (H) <130 mg/dL    Patient Fasting for Lipid? Yes    TSH and  Free T4    Collection Time: 24  8:56 AM   Result Value Ref Range    Free T4 1.0 0.8 - 1.7 ng/dL    TSH 3.635 0.550 - 4.780 uIU/mL        Current Outpatient Medications   Medication Sig Dispense Refill    conjugated estrogens 0.625 MG/GM Vaginal Cream Thin film to external area 3 times a week as needed 30 g 1    rosuvastatin 5 MG Oral Tab Take 1 tablet (5 mg total) by mouth every other day. 45 tablet 1    loratadine 10 MG Oral Tab Take 1 tablet (10 mg total) by mouth daily as needed for Allergies.      Cholecalciferol (VITAMIN D-3 OR) Take 1,000 Int'l Units by mouth daily.        Past Medical History:    Allergic rhinitis    Post-menopausal    Spontaneous pneumothorax    [place], [place]      Past Surgical History:   Procedure Laterality Date    Colonoscopy  2019    MAC: diverticulosis, int hemorrhoids, rpt     Mammogram, both breasts  2019          x2    Screening pap smear by phys  2019      Family History   Problem Relation Age of Onset    Heart Disorder Father         valve replacement    Hypertension Father     Heart Disease Father     High Cholesterol Father     Blood Clotting Disorder Father     Other (Amyloidosis) Father     High Cholesterol Mother     Other (Other) Mother     Other (Autism) Son     Heart Disorder Maternal Grandmother     Heart Disorder Maternal Grandfather     Heart Disorder Paternal Grandmother     Heart Disorder Paternal Grandfather     Stroke Paternal Grandfather     No Known Problems Sister     No Known Problems Brother       Social History:   Social History     Socioeconomic History    Marital status:    Tobacco Use    Smoking status: Never     Passive exposure: Never    Smokeless tobacco: Never   Vaping Use    Vaping status: Never Used   Substance and Sexual Activity    Alcohol use: Yes     Alcohol/week: 2.0 standard drinks of alcohol     Types: 2 Glasses of wine per week    Drug use: Not Currently     Types: Cannabis     Comment: smokes marijuana or uses  gummies/edibles every 1-2 months    Sexual activity: Yes     Partners: Male   Other Topics Concern     Service No    Blood Transfusions No    Caffeine Concern Yes     Comment: 1 cup of coffee daily    Occupational Exposure No    Hobby Hazards No    Sleep Concern No    Stress Concern No    Weight Concern No    Special Diet No    Back Care No    Exercise Yes     Comment: 1-3 times weekly    Bike Helmet No    Seat Belt Yes    Self-Exams No     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   Exercise: three times per week.  Diet: watches fats closely and watches sugar closely      REVIEW OF SYSTEMS:   GENERAL: denies fevers, weakness, trouble sleeping or weight changes  SKIN: denies any unusual skin lesions or rashes  EYES:denies vision changes  HEENT: denies upper respiratory symptoms  LUNGS: denies cough or shortness of breath with exertion  CHEST:  denies breast changes or pain  CARDIOVASCULAR: denies chest pain or tightness on exertion: no edema  VASCULAR: denies leg cramps  GI: denies abdominal pain, bowel movement changes, blood in stool  : denies urinary problems, vaginal discharge or discomfort,  periods rmenopausal  MUSCULOSKELETAL: denies joint pain or stiffness  NEURO: denies headaches, tingling or dizziness  PSYCHE: denies depression or anxiety BC-7 is a 0 PHQ Q9 is a 0  HEMATOLOGIC: denies bleeding abnormalities  ENDOCRINE: denies temperature intolerance, polyuria, or excessive sweating.  LYMPHATICS: denies swollen glands      EXAM:   BP 96/56 (BP Location: Left arm, Patient Position: Sitting, Cuff Size: adult)   Pulse 74   Temp 97.9 °F (36.6 °C) (Temporal)   Resp 18   Ht 5' 7\" (1.702 m)   Wt 158 lb (71.7 kg)   LMP 05/07/2013   SpO2 98%   BMI 24.75 kg/m²   Body mass index is 24.75 kg/m².   GENERAL: well developed, well nourished and in no apparent distress  SKIN: no rashes,no suspicious lesions  HEENT: atraumatic, normocephalic,ears, nose and throat are normal  EYES: PERRLA, EOMI, sclera,  conjunctiva are clear  NECK: supple,no adenopathy,no carotid bruits  CHEST: no chest tenderness  BREAST: symmetrical, no suspicious mass, no nipple dimpling or discharge.  LUNGS: clear to auscultation bilateral, no rales, rhonchi or wheezing  CARDIO: RRR without murmur normal S1S2  ABD:  normal bowel sounds,soft, non tender, no masses, HSM or tenderness  :external labia, introitus  Bimanual exam normal no adnexal masses or cervical motion tenderness,   MUSCULOSKELETAL: gait saba,l no gross M/S defect.  EXTREMITIES: no clubbing, cyanosis, or edema  NEURO: oriented times three, cranial nerves are grossly intact, no gross motor or sensory deficit.    ASSESSMENT AND PLAN:   Lisbeth Francisco is a 59 year old female who presents for a complete physical exam.    Encounter Diagnoses   Name Primary?    Well female exam without gynecological exam Yes    Mixed hyperlipidemia     Vaginal atrophy     Postmenopausal     Refused influenza vaccine        Orders Placed This Encounter   Procedures    Lipid Panel    Hepatic Function Panel (7) [E]    Influenza Refused       Meds & Refills for this Visit:  Requested Prescriptions     Signed Prescriptions Disp Refills    conjugated estrogens 0.625 MG/GM Vaginal Cream 30 g 1     Sig: Thin film to external area 3 times a week as needed    rosuvastatin 5 MG Oral Tab 45 tablet 1     Sig: Take 1 tablet (5 mg total) by mouth every other day.       Imaging & Consults:  INFLUENZA REFUSED [facility]  XR DEXA BONE DENSITOMETRY (CPT=77080)  1. Well female exam without gynecological exam  Self breast exam explained. Health maintenance guidance given including vision and dental exams, vitamin D 1,000 iu daily with calcium 500 mg daily.  Lifestyle guidance provided recommended low fat diet and aerobic exercise 30 minutes 3-4 times weekly.      2. Mixed hyperlipidemia  Decrease saturated fats and avoid processed foods.  Avoid fatty foods/fried foods.  If eating meat try lean cuts of meat such as chicken  and fish (salmon and tuna).  If able, add gomez seeds, almonds, walnuts, pumpkin seeds, sunflower seeds, beans, fruits, vegetables, avocado steel cut oats, lentils, quinoa, rye, wild rice, whole grain cereals to diet.   Cook with avocado oil, grape seed oil or olive oil. Review the Mediterranean diet on line.  Start rosuvastatin 5 mg every other day repeat lipids and liver function tests in 3 months  - rosuvastatin 5 MG Oral Tab; Take 1 tablet (5 mg total) by mouth every other day.  Dispense: 45 tablet; Refill: 1  - Lipid Panel; Future  - Hepatic Function Panel (7) [E]; Future    3. Vaginal atrophy  Premarin cream thin film 3 times a week as needed    4. Postmenopausal  - XR DEXA BONE DENSITOMETRY (CPT=77080); Future    5. Refused influenza vaccine  - Influenza Refused    The patient indicates understanding of these issues and agrees to the plan.  The patient is asked to return for complete physical yearly.

## 2024-12-13 PROBLEM — N95.2 VAGINAL ATROPHY: Status: ACTIVE | Noted: 2024-12-13

## 2025-08-15 ENCOUNTER — PATIENT MESSAGE (OUTPATIENT)
Dept: FAMILY MEDICINE CLINIC | Facility: CLINIC | Age: 60
End: 2025-08-15

## 2025-08-19 ENCOUNTER — HOSPITAL ENCOUNTER (OUTPATIENT)
Dept: BONE DENSITY | Age: 60
Discharge: HOME | End: 2025-08-19
Attending: FAMILY MEDICINE

## 2025-08-19 DIAGNOSIS — Z78.0 POSTMENOPAUSAL: ICD-10-CM

## 2025-08-19 PROCEDURE — 77080 DXA BONE DENSITY AXIAL: CPT | Performed by: FAMILY MEDICINE

## 2025-08-23 ENCOUNTER — LAB ENCOUNTER (OUTPATIENT)
Dept: LAB | Age: 60
End: 2025-08-23
Attending: FAMILY MEDICINE

## 2025-08-23 DIAGNOSIS — E78.2 MIXED HYPERLIPIDEMIA: ICD-10-CM

## 2025-08-23 LAB
ALBUMIN SERPL-MCNC: 4.7 G/DL (ref 3.2–4.8)
ALP LIVER SERPL-CCNC: 59 U/L (ref 46–118)
ALT SERPL-CCNC: 11 U/L (ref 10–49)
AST SERPL-CCNC: 19 U/L (ref ?–34)
BILIRUB DIRECT SERPL-MCNC: 0.2 MG/DL (ref ?–0.3)
BILIRUB SERPL-MCNC: 0.9 MG/DL (ref 0.3–1.2)
CHOLEST SERPL-MCNC: 229 MG/DL (ref ?–200)
FASTING PATIENT LIPID ANSWER: YES
HDLC SERPL-MCNC: 89 MG/DL (ref 40–59)
LDLC SERPL CALC-MCNC: 125 MG/DL (ref ?–100)
NONHDLC SERPL-MCNC: 140 MG/DL (ref ?–130)
PROT SERPL-MCNC: 7.3 G/DL (ref 5.7–8.2)
TRIGL SERPL-MCNC: 89 MG/DL (ref 30–149)
VLDLC SERPL CALC-MCNC: 16 MG/DL (ref 0–30)

## 2025-08-23 PROCEDURE — 80061 LIPID PANEL: CPT

## 2025-08-23 PROCEDURE — 80076 HEPATIC FUNCTION PANEL: CPT

## 2025-08-23 PROCEDURE — 36415 COLL VENOUS BLD VENIPUNCTURE: CPT

## 2025-08-24 DIAGNOSIS — E78.2 MIXED HYPERLIPIDEMIA: ICD-10-CM

## 2025-08-25 RX ORDER — ROSUVASTATIN CALCIUM 5 MG/1
5 TABLET, COATED ORAL EVERY OTHER DAY
Qty: 45 TABLET | Refills: 1 | Status: SHIPPED | OUTPATIENT
Start: 2025-08-25

## (undated) NOTE — MR AVS SNAPSHOT
After Visit Summary   1/21/2022    Analy Evin   MRN: [id number]           Visit Information     Date & Time  1/21/2022 10:30 AM Provider  Tali Byers PA-C [address], [facility]  Phone  [phone] TSH+FREE T4 [[id number] CUSTOM]  1/21/2022 (Approximate) 1/22/2023    XR DEXA BONE DENSITOMETRY (CPT=77080) [27323 CPT(R)]  1/21/2022 (Approximate) 1/21/2023      Imaging Scheduling Instructions     Around January 21, 2022   Imaging:   [facility] 2D+3D SCREENIN years if you are 21to 44years old and every year if you are 36 years or older   • Mammogram: as often as your healthcare provider recommends if you are 22years old or older.  When you should start having these exams and how often depends on your level of tests: if you are at high risk for these infections, including if you have a new sex partner or more than 1 partner, a history of STDs, a partner with an STD, or a partner who is bisexual   • HIV test: The CDC (Centers for Disease Control) recommends a rou recommended for adults caring for children, even if it has been less than 10 years since your last tetanus booster.    • Flu shot every fall if you are 50 or older, you have a high risk for complications from the flu, or you might spread the flu to others w and shoulder belts when you drive. Use a helmet when you ride a motorcycle or bicycle. If you are around guns or other firearms, practice safe handling and make sure to keep them in locked cabinets when they are not in use.  Install smoke detectors in your